# Patient Record
Sex: FEMALE | Race: BLACK OR AFRICAN AMERICAN | Employment: OTHER | URBAN - METROPOLITAN AREA
[De-identification: names, ages, dates, MRNs, and addresses within clinical notes are randomized per-mention and may not be internally consistent; named-entity substitution may affect disease eponyms.]

---

## 2019-03-12 ENCOUNTER — HOSPITAL ENCOUNTER (OUTPATIENT)
Dept: GENERAL RADIOLOGY | Age: 69
Discharge: HOME | End: 2019-03-12
Attending: INTERNAL MEDICINE
Payer: COMMERCIAL

## 2019-03-12 DIAGNOSIS — M25.512 ACUTE PAIN OF LEFT SHOULDER: ICD-10-CM
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
## 2019-03-12 DIAGNOSIS — S13.4XXA NECK PAIN WITH NECK STIFFNESS AFTER WHIPLASH INJURY TO NECK: ICD-10-CM

## 2019-03-12 PROCEDURE — 72050 X-RAY EXAM NECK SPINE 4/5VWS: CPT

## 2021-12-17 ENCOUNTER — OFFICE VISIT (OUTPATIENT)
Dept: NEUROLOGY | Age: 71
End: 2021-12-17
Payer: COMMERCIAL

## 2021-12-17 VITALS
SYSTOLIC BLOOD PRESSURE: 130 MMHG | WEIGHT: 189.4 LBS | OXYGEN SATURATION: 97 % | BODY MASS INDEX: 30.44 KG/M2 | HEIGHT: 66 IN | DIASTOLIC BLOOD PRESSURE: 80 MMHG | HEART RATE: 105 BPM

## 2021-12-17 DIAGNOSIS — R29.898 RIGHT ARM WEAKNESS: ICD-10-CM

## 2021-12-17 DIAGNOSIS — R20.0 RIGHT ARM NUMBNESS: ICD-10-CM

## 2021-12-17 DIAGNOSIS — G45.9 TIA (TRANSIENT ISCHEMIC ATTACK): Primary | ICD-10-CM

## 2021-12-17 PROCEDURE — 99204 OFFICE O/P NEW MOD 45 MIN: CPT | Performed by: PSYCHIATRY & NEUROLOGY

## 2021-12-17 RX ORDER — AMLODIPINE BESYLATE 10 MG/1
TABLET ORAL
COMMUNITY

## 2021-12-17 RX ORDER — BUPROPION HYDROCHLORIDE 300 MG/1
TABLET ORAL
COMMUNITY

## 2021-12-17 RX ORDER — ATORVASTATIN CALCIUM 10 MG/1
TABLET, FILM COATED ORAL
COMMUNITY

## 2021-12-17 RX ORDER — ASPIRIN 81 MG/1
81 TABLET ORAL DAILY
COMMUNITY
Start: 2021-12-17

## 2021-12-17 RX ORDER — ERGOCALCIFEROL 1.25 MG/1
CAPSULE ORAL
COMMUNITY

## 2021-12-17 NOTE — LETTER
1/3/2022    Patient: Orlin Cho   YOB: 1950   Date of Visit: 12/17/2021     Randall Lee, [address]   [address] [facility] [address]  Via Fax: [phone]    Dear Randall Lee MD,      Thank you for referring Ms. Orlin Cho to [facility] for evaluation. My notes for this consultation are attached. If you have questions, please do not hesitate to call me. I look forward to following your patient along with you.       Sincerely,    Lyndsay Mcfarland MD

## 2021-12-17 NOTE — PATIENT INSTRUCTIONS
-Please call after MRI and CTA of the head and neck are completed if you do not receive a call from our office within one week.    -Start taking ASA 81mg daily.   -Please stop smoking
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
## 2022-01-17 ENCOUNTER — DOCUMENTATION ONLY (OUTPATIENT)
Dept: NEUROLOGY | Age: 72
End: 2022-01-17

## 2022-01-17 NOTE — PROGRESS NOTES
Received notes from PCP Marce Dailey MD office visit 10/15/2021:  Patient was being seen for complete physical exam.  Complained that the Wellbutrin was ineffective with occasional depressive mood and crying spells. She described episodes where she \"seemingly loses control of her body\" described moments where she loses control over her mouth and water will spill over the side of her mouth while she is drinking water. Had 2 episodes that occurred the previous week. She also reported episodes where her arms and hands will become numb and stiff and and asked times where she has to put a great deal of focus into moving her arm and hand out of locked position. Patient complained of dizziness with lying down flat on her back. Patient was referred to neurology for further evaluation. She was referred to Dr. Ursula Pastrana for vertigo. Patient had COVID 19 positive test result 9/22/2021.   Other labs   3/10/2021:  TSH 1.80    9/22/2021 CBC-unremarkable  Vitamin D 41.1  CMP unremarkable except for glucose 106  LDL 85, HDL 47

## 2022-02-18 ENCOUNTER — HOSPITAL ENCOUNTER (OUTPATIENT)
Dept: CT IMAGING | Age: 72
Discharge: HOME | End: 2022-02-18
Attending: PSYCHIATRY & NEUROLOGY
Payer: COMMERCIAL

## 2022-02-18 ENCOUNTER — HOSPITAL ENCOUNTER (OUTPATIENT)
Dept: MRI IMAGING | Age: 72
Discharge: HOME | End: 2022-02-18
Attending: PSYCHIATRY & NEUROLOGY
Payer: COMMERCIAL

## 2022-02-18 DIAGNOSIS — R20.0 RIGHT ARM NUMBNESS: ICD-10-CM

## 2022-02-18 DIAGNOSIS — G45.9 TIA (TRANSIENT ISCHEMIC ATTACK): ICD-10-CM

## 2022-02-18 DIAGNOSIS — R29.898 RIGHT ARM WEAKNESS: ICD-10-CM

## 2022-02-18 LAB — CREAT BLD-MCNC: 1.2 MG/DL (ref 0.6–1.3)

## 2022-02-18 PROCEDURE — 70551 MRI BRAIN STEM W/O DYE: CPT

## 2022-02-18 PROCEDURE — 70498 CT ANGIOGRAPHY NECK: CPT

## 2022-02-18 PROCEDURE — 74011000636 HC RX REV CODE- 636: Performed by: PSYCHIATRY & NEUROLOGY

## 2022-02-18 PROCEDURE — 82565 ASSAY OF CREATININE: CPT

## 2022-02-18 RX ADMIN — IOPAMIDOL 100 ML: 755 INJECTION, SOLUTION INTRAVENOUS at 16:00

## 2022-02-21 ENCOUNTER — TELEPHONE (OUTPATIENT)
Dept: NEUROLOGY | Age: 72
End: 2022-02-21

## 2022-02-21 DIAGNOSIS — R20.0 RIGHT ARM NUMBNESS: ICD-10-CM

## 2022-02-21 DIAGNOSIS — R29.898 RIGHT ARM WEAKNESS: ICD-10-CM

## 2022-02-21 DIAGNOSIS — I67.1 CEREBRAL ANEURYSM WITHOUT RUPTURE: Primary | ICD-10-CM

## 2022-02-21 NOTE — TELEPHONE ENCOUNTER
Imaging is calling to see if we have received the report for the CTA. Please call to confirm receipt.

## 2022-02-21 NOTE — LETTER
2022 3:10 PM    RE:    Najma Blow   [address]  [facility] [address]      Thank you for agreeing to see Robbie Margie    I am referring my patient to you for evaluation possible tiny incidental aneurysm at origin of left superior cerebellar artery. Please see her pertinent patient information below. [facility] at [address] [place] [address] [address] [facility] [address]  Phone: [phone]  Fax: [phone]  [facility] - Referral Requisition      _________________________________________________________________________________________     Name:  Najma Blow    MRN: [id number] : 1950              Sex: F   Address:  [address] [place] [address]: [phone]    [facility] [address] Preferred*: [phone]      Order: REFERRAL TO NEUROINTERVENTIONAL SURGERY  Class: Outpatient Referral                Referral Priority: Routine  Associated Dx: Cerebral aneurysm without rupture (I67.1)     Comments: Pt with possible tiny incidental aneurysm at origin of left superior cerebellar artery.     If Coverage Data blank - Coverage Not Found.   Primary Coverage Secondary Coverage   Payor: WEN [[id number]]  Plan: Beni [[id number]]  ID: [id number]     Subscriber Information:  Name: Sunny See  : 1899  Address: [address] [facility]: [address]: [place]      Zip: [address]  Group No: [id number] Payor:  []  Plan:  []  ID:      Subscriber Information:  Name:    :    Address:    City:    St:        Zip:    Group No:       Referral associated with order: [id number]     Referred by: Andrey Uriostegui MD  Referral Reason: Specialty Services Required  Referred To:               Letha Srinivasan              [address] [address]         Phone:  Fax:      [phone]      Visits Requested: 1  Order Date: 2022  Expiration Date:        To Specialty/Loc:               Phone:  Fax:  -    -          **This is not your Insurance Authorization. If your insurance requires an authorization, please see the Referral Coordinator or someone at our [employer] who will acquire that for you.     Electronically Signed By: Sophy Vaca      Ordering user:  Anil Posadas MD Lic #  < Not on File > NPI: [id number]               I appreciate your assistance in Ms. Suarez's care  and look forward to your findings and recommendations.         Sincerely,      Navdeep Cuevas MD

## 2022-02-21 NOTE — LETTER
2022 3:07 PM    RE:    Alexus Giles   [address] [facility] [address] [facility] [address]      Thank you for agreeing to see Betzaida Gagnon    I am referring my patient to you for evaluation of Pt with intermittent right arm numbness and weakness at times with a pinching feeling in lateral right neck. nTOS is a consideration . Eval and tx. Please see her  pertinent patient information below. [facility] at [address] [place] [address]  Phone: [phone]  Fax: [phone]  [facility] - Referral Requisition      _________________________________________________________________________________________     Name:  Alexus Giles    MRN: [id number] : 1950              Sex: F   Address:   [facility] [address]: [phone]    Eric Jones [address] [facility] Preferred*: [phone]      Order: REFERRAL TO PHYSICAL THERAPY  Class: Outpatient Referral                Referral Priority: Routine  Associated Dx: Right arm weakness (R29.898)  Right arm numbness (R20.0)     Comments: Pt with intermittent right arm numbness and weakness at times with a pinching feeling in lateral right neck. nTOS is a consideration . Eval and tx     If Coverage Data blank - Coverage Not Found.   Primary Coverage Secondary Coverage   Payor: Ney Larsen [[id number]]  Plan: [facility] [[id number]]  ID: [id number]     Subscriber Information:  Name: Sal Salvador  : 1899  Address: [address] [place] [address] [facility]: [facility]      Zip: [address]  Group No: [id number] Payor:  []  Plan:  []  ID:      Subscriber Information:  Name:    :    Address:    [facility]:    St:        Zip:    Group No:       Referral associated with order: [id number]     Referred by: Krysta Kingston MD  Referral Reason: Specialty Services Required  Referred To:                                        Phone:  Fax:             Visits Requested: 1  Order Date: 2022  Expiration Date:        To Specialty/Loc: Phone:  Fax:  -    -          **This is not your Insurance Authorization. If your insurance requires an authorization, please see the Referral Coordinator or someone at our [employer] who will acquire that for you.     Electronically Signed By: Jimbo Ruelas      Ordering user:  David Davila MD Lic #  < Not on File > NPI: [id number]               I appreciate your assistance in Ms. Suarez's care  and look forward to your findings and recommendations.         Sincerely,      Sona Weiner MD

## 2022-02-21 NOTE — TELEPHONE ENCOUNTER
Lynn Worrell - Please call pt: Great news, MRI brain wo contrast 2/18/22 was normal.  CTA H/N 2/18/22 looks great, incidental 2mm (very tiny) possible aneurysm seen. I am going to refer her to Neurointerventional Surgery so they can review imaging and decide if f/u imaging is needed in the future. I have also placed an order to PT.  We can reassess at her f/u in May to see if sxs improve/resolve with PT ([facility], this PT is very specific and may not be possible at all PT offices.)

## 2022-02-22 ENCOUNTER — TELEPHONE (OUTPATIENT)
Dept: NEUROLOGY | Age: 72
End: 2022-02-22

## 2022-02-22 NOTE — TELEPHONE ENCOUNTER
I called the patient and gave her the information from Dr. Tutu Yarbrough. I Faxed over referrals via communications and able to view in the letters section in the chart.

## 2022-03-04 ENCOUNTER — TELEPHONE (OUTPATIENT)
Dept: NEUROSURGERY | Age: 72
End: 2022-03-04

## 2022-04-01 ENCOUNTER — OFFICE VISIT (OUTPATIENT)
Dept: NEUROSURGERY | Age: 72
End: 2022-04-01
Payer: COMMERCIAL

## 2022-04-01 VITALS
OXYGEN SATURATION: 95 % | TEMPERATURE: 97.5 F | WEIGHT: 187.6 LBS | HEIGHT: 66 IN | DIASTOLIC BLOOD PRESSURE: 82 MMHG | SYSTOLIC BLOOD PRESSURE: 130 MMHG | BODY MASS INDEX: 30.15 KG/M2 | HEART RATE: 76 BPM

## 2022-04-01 DIAGNOSIS — I67.1 CEREBRAL ANEURYSM: Primary | ICD-10-CM

## 2022-04-01 PROCEDURE — 99204 OFFICE O/P NEW MOD 45 MIN: CPT | Performed by: RADIOLOGY

## 2022-04-01 NOTE — H&P (VIEW-ONLY)
Neurointerventional Surgery New Outpatient Visit    Patient: Satya Hodge MRN: [id number]  SSN: [id number]    YOB: 1950  Age: 80 y.o. Sex: female      Subjective:      Satya Client is a 80 y.o. female who was referred by Dr. 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 This patient was hospitalized in February with right-sided numbness that resolved. A CTA of the head and neck was performed which demonstrated the small incidental aneurysm. MRI was negative for stroke. The patient has also been experiencing episodes of vertigo and is undergoing vestibular therapy with some improvement. She describes \"feeling a pinched nerve in the left corner of her eye\" that only last for seconds and then goes away. This is becoming more frequent. My main concern is that she is also intermittently describing double vision, especially when laying down. She does not endorse headaches. The patient describes \"numbness in her right arm and leg\" intermittently. Her last episode was 2 weeks ago and \"lasted hours\". The etiology is currently unknown but stroke has been ruled out by neurology. Dr. Deena Zambrano continues to work-up this ongoing issue. This patient reports smoking 1 cigarette a day. She occasionally smokes 2 cigarettes a day but has never smoked more than 5 cigarettes in a single day. She reports no family history of cerebral aneurysm but believes an aunt had a brain hemorrhage while being treated for cancer. Her past surgical history is significant for appendectomy when she was 23years old, uncomplicated. No other surgeries. She reports having a \"good heart\" and does not have diabetes. Her chart indicates hyperlipidemia, hypertension and benign paroxysmal positional vertigo diagnosed by ENT in December 2021.   She does have osteoarthritis in her knees and ankles which limits her ability to perform tandem gait    Review of symptoms is positive for intermittent constipation/bloating that responds to laxatives. Past Medical History:   Diagnosis Date    BPPV (benign paroxysmal positional vertigo) 12/2021    Diagnosed by ENT    Depression     HLD (hyperlipidemia)     HTN (hypertension)     OA (osteoarthritis)      Past Surgical History:   Procedure Laterality Date    HX APPENDECTOMY        Family History   Problem Relation Age of Onset    Stroke Mother         Dec 75yo    Heart Disease Mother         3 heart surgeries     Other Father         Dec 37yo, fell of a building scaffolding    Diabetes Brother     Diabetes Brother      Social History     Tobacco Use    Smoking status: Current Every Day Smoker    Smokeless tobacco: Never Used    Tobacco comment: 1-2 cigarettes a day   Substance Use Topics    Alcohol use: Not Currently      Current Outpatient Medications   Medication Sig Dispense Refill    amLODIPine (NORVASC) 10 mg tablet amlodipine 10 mg tablet      atorvastatin (LIPITOR) 10 mg tablet atorvastatin 10 mg tablet      ergocalciferol (ERGOCALCIFEROL) 1,250 mcg (50,000 unit) capsule ergocalciferol (vitamin D2) 1,250 mcg (50,000 unit) capsule      buPROPion XL (WELLBUTRIN XL) 300 mg XL tablet bupropion HCl  mg 24 hr tablet, extended release (Patient not taking: Reported on 4/1/2022)      aspirin delayed-release 81 mg tablet Take 1 Tablet by mouth daily. (Patient not taking: Reported on 4/1/2022)          No Known Allergies    Review of Systems:  A comprehensive review of systems was negative except for that written in the History of Present Illness. Objective:     Vitals:    04/01/22 1033   BP: 130/82   Pulse: 76   Temp: 97.5 °F (36.4 °C)   TempSrc: Temporal   SpO2: 95%   Weight: 187 lb 9.6 oz (85.1 kg)   Height: 5' 6\" (1.676 m)        Physical Exam:  GENERAL: alert, cooperative, no distress, appears stated age  THROAT & NECK: normal and no erythema or exudates noted.     Vascular: No carotid bruit or objective pulsatile tinnitus  LUNG: clear to auscultation bilaterally  HEART: regular rate and rhythm, S1, S2 normal, no murmur, click, rub or gallop  ABDOMEN: Distended, non-tender. Bowel sounds normal. No masses,  no organomegaly  PSYCHIATRIC: non focal    Neurologic Exam:  Mental Status:  Alert and oriented x 4. Appropriate affect, mood and behavior. Language:    Normal fluency, repetition, comprehension and naming. Cranial Nerves:   Normal appearing fundi, sharp discs. Pupils equal, round and reactive to light. Visual fields full to confrontation. Extraocular movements intact. End gaze nystagmus bilaterally, L>R. Facial sensation intact V1 - V3. Full facial strength, no asymmetry. Hearing intact bilaterally. No dysarthria. Tongue protrudes to midline, palate elevates symmetrically. Shoulder shrug 5/5 bilaterally. Motor:    No pronator drift. Bulk and tone normal.      5/5 power in all extremities proximally and distally. No involuntary movements. Sensation:    Sensation intact throughout to light touch, temperature,     pinprick, vibration, proprioception; decreased pinprick in length    dependent fashion    Reflexes:    Reflexes are 2+ at the biceps, triceps, patella and achilles     bilaterally. Negative Babinskis    Coordination & Gait: Normal.      Imaging:    I independently reviewed and interpreted a CTA of the head and neck obtained on 2/18/2022 and MRI of the brain without contrast.  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 Although infundibulum is a possibility, aneurysm is favored. No other aneurysms are identified. There is no intracranial or cervical stenosis. The MRI was negative for acute stroke. No masses were seen. See key images below.         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 Given this patient's ongoing complaints of intermittent diplopia, I think diagnostic cerebral angiography is indicated to determine whether this is an aneurysm or a normal variant infundibulum. The patient has some other ocular symptoms that make me favor an ocular cause of diplopia versus central neurologic but the excrescence from the basilar artery is in the vicinity of the 3rd cranial nerve and should be fully evaluated. This patient does not endorse diplopia today. She reports no headaches and vestibular symptoms are improving with therapy, presumably benign paroxysmal positional vertigo given the ENT diagnosis on the chart. We will schedule diagnostic cerebral angiography at [facility], benefits and alternatives to diagnostic angiography were discussed with the patient in detail today. All her questions were answered. Written informed consent was obtained in the office      Thank you for this consult and participating in the care of this patient. I have discussed the diagnosis with the patient and the intended plan as seen in the above orders. Patient is in agreement.       Signed By: Walter Mcgarry MD     April 1, 2022

## 2022-04-01 NOTE — PATIENT INSTRUCTIONS
Diagnostic Angiogram on 4/11/2022 at [address] time 7:00 am at Patient Registration. Blood work to be done at least one week prior to procedure at a [employer].  No eating or drinking after midnight the night prior to Angiogram and take all morning medications with sips of water the morning of the angiogram.         Learning About How Hospitals and [facility] Safe From [address] [place] [address] and clinics now are treating people who are infected with COVID-19. So if you're in the hospital or clinic for any other reason, this may be an unsettling time. It's common to be concerned about becoming infected with the virus. But hospitals and clinics have policies to prevent the spread of infections. For example, doctors and nurses are trained to wash their hands before they treat you. Health care centers have stepped up these policies now. They are taking further steps to protect their patients. As long as TIHAM-29 remains a public health problem, things are going to be different when you go to a health care facility. They may have new rules for your safety. These could include having you wear a cloth face cover, meeting you outside the clinic, and having you sit away from others in the waiting room. What are hospitals and clinics doing to keep you safe? Your care team is very aware of the threat of COVID-19. They are doing everything they can to keep you safe. Hospitals and clinics may have different policies. But in general, you may expect many of these measures:  · You will be screened for COVID-19. When you come to the hospital, you may have your temperature taken. You'll be asked about any symptoms, such as a fever, a cough, or shortness of breath. You may be asked if you've had contact with anyone who's been diagnosed with the virus. And you may be asked if you've traveled to any place that has had an outbreak.   · The staff may try to do as much as possible outside the facility. For example, you may be asked to fill out paperwork online or in your car before you come inside. The person giving you a ride home may be asked to wait outside. These new rules to help protect you may make routine tasks take longer than usual.  · People who have COVID-19 are treated in a separate area. Many hospitals and clinics have staff members who treat only these patients. This helps limit the spread of the infection. · Visitors may be limited. In some cases, no visitors are allowed. In others, only one healthy visitor is allowed. Children may be limited to having only one adult with them. You can connect with family and friends using your phone or computer. If you need something brought from home, such as glasses or a phone , find out where the item can be dropped off. · The hospital or clinic follows guidelines to prevent infection. These include:  ? Washing hands often. ? Disinfecting high-touch surfaces. ? Wearing face masks or other protective equipment. ? Making extra space for social distancing. What can you do to stay safe? We all have a role to play in keeping ourselves safe and preventing the spread of COVID-19. Here are some things you can do while you're receiving care. If you're in a hospital, stay in your room. This will limit your exposure to the virus. It may be boring, but it's the safest place for you. Wash your hands often. Use soap and water, and scrub for 20 seconds. Then rinse and dry them well. Always wash them after you use the bathroom, before you eat, and after you cough, sneeze, or blow your nose. If you can't wash your hands, use hand . Speak up if you have safety concerns. Don't be shy to correct health care workers if they aren't washing their hands properly, wearing face masks, or taking other precautions. These actions are vital to prevent the spread of infection. Try to be understanding.   This is a stressful time for everyone, including your care team. They are doing their best to keep you safe and provide you with good care. Where can you learn more? Go to http://www.gray.com/  Enter A134 in the search box to learn more about \"Learning About How Hospitals and [facility] 87 Safe From COVID-19. \"  Current as of: March 26, 2021               Content Version: 13.2  © 0373-6689 Healthwise, [employer]. Care instructions adapted under license by [employer] (which disclaims liability or warranty for this information). If you have questions about a medical condition or this instruction, always ask your healthcare professional. [place] [address] any warranty or liability for your use of this information.

## 2022-04-01 NOTE — PROGRESS NOTES
New patient referred by Dr Zeke Garrido presenting with Cerebral aneurysm. Patient reports episodes of dizziness, double and blurred vision and numbness and tingling  Denies headaches. No acute problems reported.

## 2022-04-01 NOTE — PROGRESS NOTES
Neurointerventional Surgery New Outpatient Visit    Patient: Flaca Carr MRN: [id number]  SSN: [id number]    YOB: 1950  Age: 80 y.o. Sex: female      Subjective:      Flaca Carr is a 80 y.o. female who was referred by Dr. 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 This patient was hospitalized in February with right-sided numbness that resolved. A CTA of the head and neck was performed which demonstrated the small incidental aneurysm. MRI was negative for stroke. The patient has also been experiencing episodes of vertigo and is undergoing vestibular therapy with some improvement. She describes \"feeling a pinched nerve in the left corner of her eye\" that only last for seconds and then goes away. This is becoming more frequent. My main concern is that she is also intermittently describing double vision, especially when laying down. She does not endorse headaches. The patient describes \"numbness in her right arm and leg\" intermittently. Her last episode was 2 weeks ago and \"lasted hours\". The etiology is currently unknown but stroke has been ruled out by neurology. Dr. David Young continues to work-up this ongoing issue. This patient reports smoking 1 cigarette a day. She occasionally smokes 2 cigarettes a day but has never smoked more than 5 cigarettes in a single day. She reports no family history of cerebral aneurysm but believes an aunt had a brain hemorrhage while being treated for cancer. Her past surgical history is significant for appendectomy when she was 23years old, uncomplicated. No other surgeries. She reports having a \"good heart\" and does not have diabetes. Her chart indicates hyperlipidemia, hypertension and benign paroxysmal positional vertigo diagnosed by ENT in December 2021.   She does have osteoarthritis in her knees and ankles which limits her ability to perform tandem gait    Review of symptoms is positive for intermittent constipation/bloating that responds to laxatives. Past Medical History:   Diagnosis Date    BPPV (benign paroxysmal positional vertigo) 12/2021    Diagnosed by ENT    Depression     HLD (hyperlipidemia)     HTN (hypertension)     OA (osteoarthritis)      Past Surgical History:   Procedure Laterality Date    HX APPENDECTOMY        Family History   Problem Relation Age of Onset    Stroke Mother         Dec 75yo    Heart Disease Mother         3 heart surgeries     Other Father         Dec 39yo, fell of a building scaffolding    Diabetes Brother     Diabetes Brother      Social History     Tobacco Use    Smoking status: Current Every Day Smoker    Smokeless tobacco: Never Used    Tobacco comment: 1-2 cigarettes a day   Substance Use Topics    Alcohol use: Not Currently      Current Outpatient Medications   Medication Sig Dispense Refill    amLODIPine (NORVASC) 10 mg tablet amlodipine 10 mg tablet      atorvastatin (LIPITOR) 10 mg tablet atorvastatin 10 mg tablet      ergocalciferol (ERGOCALCIFEROL) 1,250 mcg (50,000 unit) capsule ergocalciferol (vitamin D2) 1,250 mcg (50,000 unit) capsule      buPROPion XL (WELLBUTRIN XL) 300 mg XL tablet bupropion HCl  mg 24 hr tablet, extended release (Patient not taking: Reported on 4/1/2022)      aspirin delayed-release 81 mg tablet Take 1 Tablet by mouth daily. (Patient not taking: Reported on 4/1/2022)          No Known Allergies    Review of Systems:  A comprehensive review of systems was negative except for that written in the History of Present Illness. Objective:     Vitals:    04/01/22 1033   BP: 130/82   Pulse: 76   Temp: 97.5 °F (36.4 °C)   TempSrc: Temporal   SpO2: 95%   Weight: 187 lb 9.6 oz (85.1 kg)   Height: 5' 6\" (1.676 m)        Physical Exam:  GENERAL: alert, cooperative, no distress, appears stated age  THROAT & NECK: normal and no erythema or exudates noted.     Vascular: No carotid bruit or objective pulsatile tinnitus  LUNG: clear to auscultation bilaterally  HEART: regular rate and rhythm, S1, S2 normal, no murmur, click, rub or gallop  ABDOMEN: Distended, non-tender. Bowel sounds normal. No masses,  no organomegaly  PSYCHIATRIC: non focal    Neurologic Exam:  Mental Status:  Alert and oriented x 4. Appropriate affect, mood and behavior. Language:    Normal fluency, repetition, comprehension and naming. Cranial Nerves:   Normal appearing fundi, sharp discs. Pupils equal, round and reactive to light. Visual fields full to confrontation. Extraocular movements intact. End gaze nystagmus bilaterally, L>R. Facial sensation intact V1 - V3. Full facial strength, no asymmetry. Hearing intact bilaterally. No dysarthria. Tongue protrudes to midline, palate elevates symmetrically. Shoulder shrug 5/5 bilaterally. Motor:    No pronator drift. Bulk and tone normal.      5/5 power in all extremities proximally and distally. No involuntary movements. Sensation:    Sensation intact throughout to light touch, temperature,     pinprick, vibration, proprioception; decreased pinprick in length    dependent fashion    Reflexes:    Reflexes are 2+ at the biceps, triceps, patella and achilles     bilaterally. Negative Babinskis    Coordination & Gait: Normal.      Imaging:    I independently reviewed and interpreted a CTA of the head and neck obtained on 2/18/2022 and MRI of the brain without contrast.  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 Although infundibulum is a possibility, aneurysm is favored. No other aneurysms are identified. There is no intracranial or cervical stenosis. The MRI was negative for acute stroke. No masses were seen. See key images below.         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 Given this patient's ongoing complaints of intermittent diplopia, I think diagnostic cerebral angiography is indicated to determine whether this is an aneurysm or a normal variant infundibulum. The patient has some other ocular symptoms that make me favor an ocular cause of diplopia versus central neurologic but the excrescence from the basilar artery is in the vicinity of the 3rd cranial nerve and should be fully evaluated. This patient does not endorse diplopia today. She reports no headaches and vestibular symptoms are improving with therapy, presumably benign paroxysmal positional vertigo given the ENT diagnosis on the chart. We will schedule diagnostic cerebral angiography at [facility], benefits and alternatives to diagnostic angiography were discussed with the patient in detail today. All her questions were answered. Written informed consent was obtained in the office      Thank you for this consult and participating in the care of this patient. I have discussed the diagnosis with the patient and the intended plan as seen in the above orders. Patient is in agreement.       Signed By: Dipika Arnold MD     April 1, 2022

## 2022-04-02 LAB
ALBUMIN SERPL-MCNC: 4.5 G/DL (ref 3.8–4.8)
ALBUMIN/GLOB SERPL: 1.6 {RATIO} (ref 1.2–2.2)
ALP SERPL-CCNC: 130 IU/L (ref 44–121)
ALT SERPL-CCNC: 10 IU/L (ref 0–32)
AST SERPL-CCNC: 19 IU/L (ref 0–40)
BILIRUB SERPL-MCNC: 0.4 MG/DL (ref 0–1.2)
BUN SERPL-MCNC: 11 MG/DL (ref 8–27)
BUN/CREAT SERPL: 11 (ref 12–28)
CALCIUM SERPL-MCNC: 9.9 MG/DL (ref 8.7–10.3)
CHLORIDE SERPL-SCNC: 104 MMOL/L (ref 96–106)
CO2 SERPL-SCNC: 26 MMOL/L (ref 20–29)
CREAT SERPL-MCNC: 1.03 MG/DL (ref 0.57–1)
EGFR: 58 ML/MIN/1.73
ERYTHROCYTE [DISTWIDTH] IN BLOOD BY AUTOMATED COUNT: 13.5 % (ref 11.7–15.4)
GLOBULIN SER CALC-MCNC: 2.9 G/DL (ref 1.5–4.5)
GLUCOSE SERPL-MCNC: 92 MG/DL (ref 65–99)
HCT VFR BLD AUTO: 43.4 % (ref 34–46.6)
HGB BLD-MCNC: 14.1 G/DL (ref 11.1–15.9)
MCH RBC QN AUTO: 28.5 PG (ref 26.6–33)
MCHC RBC AUTO-ENTMCNC: 32.5 G/DL (ref 31.5–35.7)
MCV RBC AUTO: 88 FL (ref 79–97)
PLATELET # BLD AUTO: 287 X10E3/UL (ref 150–450)
POTASSIUM SERPL-SCNC: 4.4 MMOL/L (ref 3.5–5.2)
PROT SERPL-MCNC: 7.4 G/DL (ref 6–8.5)
RBC # BLD AUTO: 4.95 X10E6/UL (ref 3.77–5.28)
SODIUM SERPL-SCNC: 145 MMOL/L (ref 134–144)
WBC # BLD AUTO: 4.1 X10E3/UL (ref 3.4–10.8)

## 2022-04-08 ENCOUNTER — TELEPHONE (OUTPATIENT)
Dept: NEUROSURGERY | Age: 72
End: 2022-04-08

## 2022-04-08 NOTE — TELEPHONE ENCOUNTER
Message left for patient to confirm Diagnostic angiogram 4/11/2022 at [facility]. Arrival time 7:00 am at Out patient registration. No eating or drinking after midnight on Fei night, take morning medications with sips of water and take 650 mg Aspirin Fei night. Call office to confirm receipt of message.

## 2022-04-11 ENCOUNTER — DOCUMENTATION ONLY (OUTPATIENT)
Dept: NEUROSURGERY | Age: 72
End: 2022-04-11

## 2022-04-11 ENCOUNTER — HOSPITAL ENCOUNTER (OUTPATIENT)
Dept: INTERVENTIONAL RADIOLOGY/VASCULAR | Age: 72
Discharge: HOME | End: 2022-04-11
Attending: RADIOLOGY | Admitting: RADIOLOGY
Payer: COMMERCIAL

## 2022-04-11 VITALS
OXYGEN SATURATION: 99 % | RESPIRATION RATE: 20 BRPM | SYSTOLIC BLOOD PRESSURE: 117 MMHG | TEMPERATURE: 97.9 F | WEIGHT: 180 LBS | BODY MASS INDEX: 28.93 KG/M2 | HEIGHT: 66 IN | HEART RATE: 67 BPM | DIASTOLIC BLOOD PRESSURE: 69 MMHG

## 2022-04-11 DIAGNOSIS — I67.1 CEREBRAL ANEURYSM: ICD-10-CM

## 2022-04-11 PROCEDURE — 36224 PLACE CATH CAROTD ART: CPT

## 2022-04-11 PROCEDURE — C1760 CLOSURE DEV, VASC: HCPCS

## 2022-04-11 PROCEDURE — 77030012468 HC VLV BLEEDBK CNTRL ABBT -B

## 2022-04-11 PROCEDURE — C1769 GUIDE WIRE: HCPCS

## 2022-04-11 PROCEDURE — C1894 INTRO/SHEATH, NON-LASER: HCPCS

## 2022-04-11 PROCEDURE — 74011000250 HC RX REV CODE- 250: Performed by: RADIOLOGY

## 2022-04-11 PROCEDURE — 2709999900 HC NON-CHARGEABLE SUPPLY

## 2022-04-11 PROCEDURE — 74011000636 HC RX REV CODE- 636: Performed by: RADIOLOGY

## 2022-04-11 PROCEDURE — 77030008584 HC TOOL GDWRE DEV TERU -A

## 2022-04-11 PROCEDURE — C1887 CATHETER, GUIDING: HCPCS

## 2022-04-11 PROCEDURE — 74011250636 HC RX REV CODE- 250/636: Performed by: RADIOLOGY

## 2022-04-11 PROCEDURE — 77030008638 HC TU CONN COOK -A

## 2022-04-11 PROCEDURE — 36222 PLACE CATH CAROTID/INOM ART: CPT

## 2022-04-11 RX ORDER — FLUMAZENIL 0.1 MG/ML
0.5 INJECTION INTRAVENOUS
Status: DISCONTINUED | OUTPATIENT
Start: 2022-04-11 | End: 2022-04-11

## 2022-04-11 RX ORDER — HEPARIN SODIUM 1000 [USP'U]/ML
10000 INJECTION, SOLUTION INTRAVENOUS; SUBCUTANEOUS
Status: DISCONTINUED | OUTPATIENT
Start: 2022-04-11 | End: 2022-04-11

## 2022-04-11 RX ORDER — VERAPAMIL HYDROCHLORIDE 2.5 MG/ML
5 INJECTION, SOLUTION INTRAVENOUS
Status: DISCONTINUED | OUTPATIENT
Start: 2022-04-11 | End: 2022-04-11

## 2022-04-11 RX ORDER — SODIUM CHLORIDE 9 MG/ML
25 INJECTION, SOLUTION INTRAVENOUS
Status: DISCONTINUED | OUTPATIENT
Start: 2022-04-11 | End: 2022-04-11

## 2022-04-11 RX ORDER — FENTANYL CITRATE 50 UG/ML
25-200 INJECTION, SOLUTION INTRAMUSCULAR; INTRAVENOUS
Status: DISCONTINUED | OUTPATIENT
Start: 2022-04-11 | End: 2022-04-11

## 2022-04-11 RX ORDER — LIDOCAINE HYDROCHLORIDE AND EPINEPHRINE 10; 10 MG/ML; UG/ML
20 INJECTION, SOLUTION INFILTRATION; PERINEURAL
Status: COMPLETED | OUTPATIENT
Start: 2022-04-11 | End: 2022-04-11

## 2022-04-11 RX ORDER — LIDOCAINE HYDROCHLORIDE 20 MG/ML
20 INJECTION, SOLUTION INFILTRATION; PERINEURAL
Status: COMPLETED | OUTPATIENT
Start: 2022-04-11 | End: 2022-04-11

## 2022-04-11 RX ORDER — NALOXONE HYDROCHLORIDE 0.4 MG/ML
0.4 INJECTION, SOLUTION INTRAMUSCULAR; INTRAVENOUS; SUBCUTANEOUS
Status: DISCONTINUED | OUTPATIENT
Start: 2022-04-11 | End: 2022-04-11

## 2022-04-11 RX ORDER — MIDAZOLAM HYDROCHLORIDE 1 MG/ML
.5-5 INJECTION, SOLUTION INTRAMUSCULAR; INTRAVENOUS
Status: DISCONTINUED | OUTPATIENT
Start: 2022-04-11 | End: 2022-04-11

## 2022-04-11 RX ADMIN — Medication 4000 UNITS: at 08:21

## 2022-04-11 RX ADMIN — SODIUM CHLORIDE 25 ML/HR: 9 INJECTION, SOLUTION INTRAVENOUS at 08:34

## 2022-04-11 RX ADMIN — LIDOCAINE HYDROCHLORIDE 7 ML: 20 INJECTION, SOLUTION INFILTRATION; PERINEURAL at 09:32

## 2022-04-11 RX ADMIN — Medication 4000 UNITS: at 08:18

## 2022-04-11 RX ADMIN — MIDAZOLAM HYDROCHLORIDE 0.5 MG: 1 INJECTION, SOLUTION INTRAMUSCULAR; INTRAVENOUS at 09:17

## 2022-04-11 RX ADMIN — Medication 4000 UNITS: at 08:20

## 2022-04-11 RX ADMIN — FENTANYL CITRATE 25 MCG: 50 INJECTION, SOLUTION INTRAMUSCULAR; INTRAVENOUS at 08:52

## 2022-04-11 RX ADMIN — FENTANYL CITRATE 50 MCG: 50 INJECTION, SOLUTION INTRAMUSCULAR; INTRAVENOUS at 08:34

## 2022-04-11 RX ADMIN — LIDOCAINE HYDROCHLORIDE,EPINEPHRINE BITARTRATE 10 ML: 10; .01 INJECTION, SOLUTION INFILTRATION; PERINEURAL at 09:33

## 2022-04-11 RX ADMIN — IOPAMIDOL 135 ML: 612 INJECTION, SOLUTION INTRAVENOUS at 09:32

## 2022-04-11 RX ADMIN — Medication 4000 UNITS: at 08:17

## 2022-04-11 RX ADMIN — MIDAZOLAM HYDROCHLORIDE 1 MG: 1 INJECTION, SOLUTION INTRAMUSCULAR; INTRAVENOUS at 08:34

## 2022-04-11 RX ADMIN — HEPARIN SODIUM 2000 UNITS: 1000 INJECTION, SOLUTION INTRAVENOUS; SUBCUTANEOUS at 08:57

## 2022-04-11 NOTE — BRIEF OP NOTE
NEUROINTERVENTIONAL SURGERY POST-PROCEDURE NOTE    PROCEDURE:  Cerebral angiogram  3D rotational angiogram:  LVA injections  Angioseal:  Right CFA    VESSEL(S) STUDIED:  1. LVA + 3D  2. LCCA  3. RCCA VESSEL(S) TREATED:  1. none      PRELIMINARY REPORT & DISPOSITION:     No aneurysm. Left SCA infundibulum is a normal variant. No cervical arterial stenosis. COMPLICATIONS:  None immediate    FOLLOW-UP:  Outpatient virtual visit or phone call to discuss [address]:  4/11/2022 9:35 AM     ATTENDING SURGEON(S):  Quiana Newsome MD      ANESTHESIA:   Nursing sedation    EBL: 1 mL    Contrast: 135 cc Isovue 300    MEDICATIONS:   See nursing record    PUNCTURE SITE:  Right common femoral artery. Arteriotomy closed with 6F Angioseal.  Flat with leg straight x 1 hour.

## 2022-04-11 NOTE — PROGRESS NOTES
Name of procedure:  cerebral angiogram    Sedation medications given: Fentanyl 75 mcg, versed 1.5 mg     Sedation tolerated: well     Total Sedation time: 40 min     Vital Signs: stable     Any complications related to procedure: see orders     Post Procedure Care Needed/order sets placed in connect care: see orders     Pt tolerated procedure well. VSS. No C/O pain. Dressing to site D&I. No bleeding or hematoma noted to site. IV D/Cd. Discharge instructions given. Copy on chart and copy given to pt. Pt verbalized understanding. Pt taken to car by wheelchair and taken home by family. NAD noted at time of discharge.

## 2022-04-11 NOTE — ROUTINE PROCESS
Pt arrives ambulatory to angio department accompanied by ex- for diagnostic angiography procedure. All assessments completed and consent was reviewed. Education given was regarding procedure, conscious sedation, post-procedure care and  management/follow-up. Opportunity for questions was provided and all questions and concerns were addressed.

## 2022-04-11 NOTE — INTERVAL H&P NOTE
Update History & Physical    The Patient's History and Physical of April 1, 2022 was reviewed with the patient and I examined the patient. There was a change in history and exam today. The surgical site was confirmed by the patient and me. The patient reports some subjective right hand weakness at baseline, she denies any other focal neurological symptoms. She also reports intermittently having her middle and ring finger lock up in her right hand at times. Neuro exam is intact. NIHSS 0. Lungs clear to auscultation, but diminished posteriorly and left anteriorly. Abdomen soft, non-tender. Active bowel sounds. No organomegaly or masses. Skin warm to touch. Appropriate for ethnicity. No cyanosis or significant edema. She does report averaging smoking 1 cigarette a day and drinks a 6 pack of Lopez's Hard alcoholic beverage of week. Plan:  The risk, benefits, expected outcome, and alternative to the recommended procedure have been discussed with the patient. Patient understands and wants to proceed with the procedure. We will proceed with a diagnostic cerebral angiogram. Opportunity for questions provided.      Electronically signed by Jeannette Beck NP on 4/11/2022 at 8:11 AM

## 2022-04-11 NOTE — DISCHARGE INSTRUCTIONS
[address] [facility]  [facility]            Cerebral Angiography Discharge Instructions    General Information: This test is done to evaluate the blood vessels in your brain and neck. Following the procedure, you will be asked to lie flat on your back for 1 hour after the procedure to prevent bleeding complications. The physician may use an arterial closure device that will decrease your recovery time. If this is used, your nurse will explain the difference in recovery. We have no way to determine if we can use a closure device until the procedure is started. We will let you know when you wake up after the procedure. Home Care Instructions: You can resume your regular diet. Do not bathe, swim, drink alcohol, or make any important legal decisions in the next 48 hours. You may drive after 24 hours. Do not lift anything heavier than a gallon of milk for 2 days. Watch the site carefully for signs of infection, like fever, drainage, redness, and/or swelling. If you were asked to hold any blood thinners prior to the procedure, you may restart that medicine the day after the procedure is completed or when instructed by your physician. Recline your car seat for the ride home. Call If: You should call your Physician and/or the Radiology Nurse if you have any signs of infection like fever, drainage, redness, and/or swelling. If the puncture site should ooze, please call. Also call if you have any pain, decreased sensation, numbness, tingling, swelling, or change in color to the affected extremity. SEEK IMMEDIATE MEDICAL CARE IF YOUR PUNCTURE SITE STARTS TO BLEED. APPLY ENOUGH FIRM PRESSURE TO THE SITE WITH THE TIPS OF YOUR FINGERS TO STOP THE BLEEDING. Arterial bleeding is a medical emergency and should be evaluated immediately. Follow-Up Instructions:  Please follow up with your ordering doctor as he/she has requested.     To Reach Us:  Side effects of sedation medications and other medications used today have been reviewed. Notify us of nausea, itching, hives, dizziness, or anything else out of the ordinary. Should you experience any of these significant changes, please call [phone] between the hours of 7:30 am and 10 pm or [phone] after hours.  After hours, ask the  to page the [address] Technologist, and describe the problem to the technologist.

## 2022-04-26 ENCOUNTER — OFFICE VISIT (OUTPATIENT)
Dept: NEUROSURGERY | Age: 72
End: 2022-04-26
Payer: COMMERCIAL

## 2022-04-26 ENCOUNTER — DOCUMENTATION ONLY (OUTPATIENT)
Dept: NEUROSURGERY | Age: 72
End: 2022-04-26

## 2022-04-26 VITALS
WEIGHT: 187 LBS | HEART RATE: 60 BPM | OXYGEN SATURATION: 95 % | DIASTOLIC BLOOD PRESSURE: 78 MMHG | TEMPERATURE: 97.4 F | HEIGHT: 66 IN | SYSTOLIC BLOOD PRESSURE: 120 MMHG | BODY MASS INDEX: 30.05 KG/M2

## 2022-04-26 DIAGNOSIS — H81.90 VESTIBULAR DIZZINESS: Primary | ICD-10-CM

## 2022-04-26 PROBLEM — I67.1 CEREBRAL ANEURYSM: Status: RESOLVED | Noted: 2022-02-18 | Resolved: 2022-04-26

## 2022-04-26 PROCEDURE — 99212 OFFICE O/P EST SF 10 MIN: CPT | Performed by: RADIOLOGY

## 2022-04-26 NOTE — PATIENT INSTRUCTIONS
You do not have a brain aneurysm. The previous finding on CTA is a normal infundibular origin of the left superior cerebellar artery. You have normal blood flow to the back of your brain. The cause for your positional dizziness is suspected in her ear. A CT of the temporal bones was ordered prior to referral to Dr. Sharita Gee (ENT) for reevaluation. Dr. Ghassan Borjas is releasing you from his care. Learning About How Hospitals and Clinics Keep You Safe From COVID-19  Overview     Many hospitals and clinics now are treating people who are infected with COVID-19. So if you're in the hospital or clinic for any other reason, this may be an unsettling time. It's common to be concerned about becoming infected with the virus. But hospitals and clinics have policies to prevent the spread of infections. For example, doctors and nurses are trained to wash their hands before they treat you. Health care centers have stepped up these policies now. They are taking further steps to protect their patients. As long as SBUNP-95 remains a public health problem, things are going to be different when you go to a health care facility. They may have new rules for your safety. These could include having you wear a cloth face cover, meeting you outside the clinic, and having you sit away from others in the waiting room. What are hospitals and clinics doing to keep you safe? Your care team is very aware of the threat of COVID-19. They are doing everything they can to keep you safe. Hospitals and clinics may have different policies. But in general, you may expect many of these measures:  · You will be screened for COVID-19. When you come to the hospital, you may have your temperature taken. You'll be asked about any symptoms, such as a fever, a cough, or shortness of breath. You may be asked if you've had contact with anyone who's been diagnosed with the virus.  And you may be asked if you've traveled to any place that has had an outbreak. · The staff may try to do as much as possible outside the facility. For example, you may be asked to fill out paperwork online or in your car before you come inside. The person giving you a ride home may be asked to wait outside. These new rules to help protect you may make routine tasks take longer than usual.  · People who have COVID-19 are treated in a separate area. Many hospitals and clinics have staff members who treat only these patients. This helps limit the spread of the infection. · Visitors may be limited. In some cases, no visitors are allowed. In others, only one healthy visitor is allowed. Children may be limited to having only one adult with them. You can connect with family and friends using your phone or computer. If you need something brought from home, such as glasses or a phone , find out where the item can be dropped off. · The hospital or clinic follows guidelines to prevent infection. These include:  ? Washing hands often. ? Disinfecting high-touch surfaces. ? Wearing face masks or other protective equipment. ? Making extra space for social distancing. What can you do to stay safe? We all have a role to play in keeping ourselves safe and preventing the spread of COVID-19. Here are some things you can do while you're receiving care. If you're in a hospital, stay in your room. This will limit your exposure to the virus. It may be boring, but it's the safest place for you. Wash your hands often. Use soap and water, and scrub for 20 seconds. Then rinse and dry them well. Always wash them after you use the bathroom, before you eat, and after you cough, sneeze, or blow your nose. If you can't wash your hands, use hand . Speak up if you have safety concerns. Don't be shy to correct health care workers if they aren't washing their hands properly, wearing face masks, or taking other precautions. These actions are vital to prevent the spread of infection.   Try to be understanding. This is a stressful time for everyone, including your care team. They are doing their best to keep you safe and provide you with good care. Where can you learn more? Go to http://www.gray.com/  Enter A134 in the search box to learn more about \"Learning About How Hospitals and [facility] Safe From COVID-19. \"  Current as of: March 26, 2021               Content Version: 13.2  © 2006-2022 Healthwise[employer]. Care instructions adapted under license by [employer] (which disclaims liability or warranty for this information). If you have questions about a medical condition or this instruction, always ask your healthcare professional. [place] [address] any warranty or liability for your use of this information.

## 2022-04-26 NOTE — PROGRESS NOTES
Procedure follow up for Cerebral aneurysm. Patient reports right groin area puncture site healed without difficulties. Denies bruising, swelling, drainage or warmth to right groin area and leg. Continues with double vision and episodes of vertigo when lying down. No acute problems reported.

## 2022-04-26 NOTE — PROGRESS NOTES
Neurointerventional Surgery  Ambulatory Progress Note      Patient: Ricardo Valencia MRN: [id number]  SSN: [id number]    YOB: 1950  Age: 80 y.o. 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 Since the procedure, she reports no swelling or drainage from her groin site. She does report mild sensitivity to touch in that region. She continues to complain of positional vertigo when she lays back supine. She has to sleep with her head angulated forward on pillows to avoid experiencing dizziness and diplopia. She has previously been evaluated in Dr. Regina Lewis office where Epley maneuvers were reportedly effective but short-lived. The symptoms have recurred since her visit there. She expressed today that she did not want to go back to Dr. Zeinab John office because of her experience there. The patient believes that her vertigo is related to \"sinus problems\" because her right ear feels \"stopped up/stuffy\"    She reports no other focal or transient neurological symptoms. Patient Active Problem List   Diagnosis Code    Vestibular dizziness H81.90        Review of Systems    A comprehensive ROS was performed and was negative except for as per HPI. Objective:     Current Outpatient Medications   Medication Sig Dispense Refill    sertraline HCl (ZOLOFT PO) Take  by mouth daily.  amLODIPine (NORVASC) 10 mg tablet amlodipine 10 mg tablet      atorvastatin (LIPITOR) 10 mg tablet atorvastatin 10 mg tablet      ergocalciferol (ERGOCALCIFEROL) 1,250 mcg (50,000 unit) capsule ergocalciferol (vitamin D2) 1,250 mcg (50,000 unit) capsule      aspirin delayed-release 81 mg tablet Take 81 mg by mouth daily.       buPROPion XL (WELLBUTRIN XL) 300 mg XL tablet bupropion HCl  mg 24 hr tablet, extended release (Patient not taking: Reported on 4/26/2022)          Visit Vitals  /78 (BP 1 Location: Left upper arm, BP Patient Position: Sitting)   Pulse 60   Temp 97.4 °F (36.3 °C) (Temporal)   Ht 5' 6\" (1.676 m)   Wt 187 lb (84.8 kg)   SpO2 95%   BMI 30.18 kg/m²       No Known Allergies    Current Outpatient Medications   Medication Sig    sertraline HCl (ZOLOFT PO) Take  by mouth daily.  amLODIPine (NORVASC) 10 mg tablet amlodipine 10 mg tablet    atorvastatin (LIPITOR) 10 mg tablet atorvastatin 10 mg tablet    ergocalciferol (ERGOCALCIFEROL) 1,250 mcg (50,000 unit) capsule ergocalciferol (vitamin D2) 1,250 mcg (50,000 unit) capsule    aspirin delayed-release 81 mg tablet Take 81 mg by mouth daily.  buPROPion XL (WELLBUTRIN XL) 300 mg XL tablet bupropion HCl  mg 24 hr tablet, extended release (Patient not taking: Reported on 4/26/2022)     No current facility-administered medications for this visit. Physical Exam:  General: NAD  HEENT: Positive for vertigo when lying supine. No nystagmus. No double vision during this episode but it is immediate and reproducible. Mucous membranes are moist.  There are no tonsillar exudates  Vascular: No carotid bruit  Lungs: clear to auscultation bilaterally  Heart: regular rate and rhythm, S1, S2 normal, no murmur, click, rub or gallop  Extremities: extremities normal, atraumatic, no cyanosis or edema. Groin site is soft, no mass, erythema or drainage  Pulses: 2+ and symmetric    Neurologic Exam:  Mental Status:  Alert and oriented x 4. Appropriate affect, mood and behavior. Language:    Normal fluency, repetition, comprehension and naming. Cranial Nerves:   Pupils equal, round and reactive to light. Visual fields full to confrontation. Extraocular movements intact. Facial sensation intact V1 - V3. Full facial strength, no asymmetry. Hearing intact bilaterally. No dysarthria. Tongue protrudes to midline, palate elevates symmetrically. Shoulder shrug 5/5 bilaterally. Motor:    No pronator drift. Bulk and tone normal.      5/5 power in all extremities proximally and distally. No involuntary movements. Sensation:    Sensation intact throughout to light touch. Coordination & Gait: Normal, tandem gait normal, FTN and HTS intact. Labs:  Lab Results   Component Value Date/Time    Sodium 145 (H) 04/01/2022 11:47 AM    Potassium 4.4 04/01/2022 11:47 AM    Chloride 104 04/01/2022 11:47 AM    CO2 26 04/01/2022 11:47 AM    Glucose 92 04/01/2022 11:47 AM    BUN 11 04/01/2022 11:47 AM    Creatinine 1.03 (H) 04/01/2022 11:47 AM    BUN/Creatinine ratio 11 (L) 04/01/2022 11:47 AM    Calcium 9.9 04/01/2022 11:47 AM     Lab Results   Component Value Date/Time    WBC 4.1 04/01/2022 11:47 AM    HGB 14.1 04/01/2022 11:47 AM    HCT 43.4 04/01/2022 11:47 AM    PLATELET 081 30/94/6530 11:47 AM    MCV 88 04/01/2022 11:47 AM     Lab Results   Component Value Date/Time    MCH 28.5 04/01/2022 11:47 AM    MCHC 32.5 04/01/2022 11:47 AM       IMAGING:       Angiogram 4/11/2022 IMPRESSIONS:     No intracranial aneurysm or AVM.    The left SCA finding described on CTA represents an infundibulum. This is a  normal variant. No further imaging follow-up is indicated.     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 There is variant bilateral frontal lobe venous drainage, more  prominently seen on the left side. No intracranial arteriovenous shunting or  fistula seen. Assessment/Plan: This patient does not have a brain aneurysm. 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 This would not cause diplopia or vertigo. Review of the cerebral angiogram and prior CTAs demonstrates no impingement on the posterior circulation. I do not think this is a vascular cause.     This patient has been referred to Dr. Joann Russell per her request.  She declined a return appointment to Dr. Dilcia Bermeo for vestibular vertigo when lying supine citing her experience in his office. I ordered a CT of her temporal bones to rule out an inner ear structural abnormality given the recurrence and severity/reproducibility of the symptoms. The patient feels the symptoms are somewhat related to \"sinus problems\" and reports \"stuffy\" ears today    The patient is released from my care pending the results of the CT temporal bones which I expect Dr. Savannah Hawk will review thoroughly. She was also instructed to follow-up with Dr. Deena Zambrano, her neurologist, per routine. Follow-up Disposition:    I have discussed the diagnosis with the patient and the intended plan as seen in the above orders. Patient is in agreement. The patient has received an after-visit summary and questions were answered concerning future plans. I have discussed medication side effects and warnings with the patient as well.       Charleston Goldmann, MD